# Patient Record
Sex: MALE | Race: WHITE | NOT HISPANIC OR LATINO | URBAN - METROPOLITAN AREA
[De-identification: names, ages, dates, MRNs, and addresses within clinical notes are randomized per-mention and may not be internally consistent; named-entity substitution may affect disease eponyms.]

---

## 2021-01-01 ENCOUNTER — EMERGENCY (EMERGENCY)
Facility: HOSPITAL | Age: 81
LOS: 1 days | End: 2021-01-01
Attending: EMERGENCY MEDICINE
Payer: MEDICARE

## 2021-01-01 VITALS
DIASTOLIC BLOOD PRESSURE: 73 MMHG | HEART RATE: 70 BPM | SYSTOLIC BLOOD PRESSURE: 178 MMHG | OXYGEN SATURATION: 95 % | TEMPERATURE: 98 F | HEIGHT: 68 IN | WEIGHT: 149.91 LBS

## 2021-01-01 VITALS
DIASTOLIC BLOOD PRESSURE: 86 MMHG | SYSTOLIC BLOOD PRESSURE: 169 MMHG | OXYGEN SATURATION: 95 % | HEART RATE: 78 BPM | RESPIRATION RATE: 18 BRPM

## 2021-01-01 LAB
ALBUMIN SERPL ELPH-MCNC: 4.1 G/DL — SIGNIFICANT CHANGE UP (ref 3.3–5)
ALP SERPL-CCNC: 68 U/L — SIGNIFICANT CHANGE UP (ref 40–120)
ALT FLD-CCNC: 33 U/L — SIGNIFICANT CHANGE UP (ref 10–45)
ANION GAP SERPL CALC-SCNC: 18 MMOL/L — HIGH (ref 5–17)
APTT BLD: 33.7 SEC — SIGNIFICANT CHANGE UP (ref 27.5–35.5)
AST SERPL-CCNC: 84 U/L — HIGH (ref 10–40)
BASE EXCESS BLDV CALC-SCNC: -3.7 MMOL/L — LOW (ref -2–2)
BASOPHILS # BLD AUTO: 0 K/UL — SIGNIFICANT CHANGE UP (ref 0–0.2)
BASOPHILS NFR BLD AUTO: 0 % — SIGNIFICANT CHANGE UP (ref 0–2)
BILIRUB SERPL-MCNC: 1 MG/DL — SIGNIFICANT CHANGE UP (ref 0.2–1.2)
BLD GP AB SCN SERPL QL: NEGATIVE — SIGNIFICANT CHANGE UP
BUN SERPL-MCNC: 32 MG/DL — HIGH (ref 7–23)
CA-I SERPL-SCNC: 1.17 MMOL/L — SIGNIFICANT CHANGE UP (ref 1.12–1.3)
CALCIUM SERPL-MCNC: 9.7 MG/DL — SIGNIFICANT CHANGE UP (ref 8.4–10.5)
CHLORIDE BLDV-SCNC: 108 MMOL/L — SIGNIFICANT CHANGE UP (ref 96–108)
CHLORIDE SERPL-SCNC: 102 MMOL/L — SIGNIFICANT CHANGE UP (ref 96–108)
CO2 BLDV-SCNC: 22 MMOL/L — SIGNIFICANT CHANGE UP (ref 22–30)
CO2 SERPL-SCNC: 18 MMOL/L — LOW (ref 22–31)
CREAT SERPL-MCNC: 2.29 MG/DL — HIGH (ref 0.5–1.3)
EOSINOPHIL # BLD AUTO: 0 K/UL — SIGNIFICANT CHANGE UP (ref 0–0.5)
EOSINOPHIL NFR BLD AUTO: 0 % — SIGNIFICANT CHANGE UP (ref 0–6)
GAS PNL BLDV: 135 MMOL/L — SIGNIFICANT CHANGE UP (ref 135–145)
GAS PNL BLDV: SIGNIFICANT CHANGE UP
GAS PNL BLDV: SIGNIFICANT CHANGE UP
GLUCOSE BLDV-MCNC: 206 MG/DL — HIGH (ref 70–99)
GLUCOSE SERPL-MCNC: 201 MG/DL — HIGH (ref 70–99)
HCO3 BLDV-SCNC: 21 MMOL/L — SIGNIFICANT CHANGE UP (ref 21–29)
HCT VFR BLD CALC: 45.7 % — SIGNIFICANT CHANGE UP (ref 39–50)
HCT VFR BLDA CALC: 50 % — SIGNIFICANT CHANGE UP (ref 39–50)
HGB BLD CALC-MCNC: 16.2 G/DL — SIGNIFICANT CHANGE UP (ref 13–17)
HGB BLD-MCNC: 15.9 G/DL — SIGNIFICANT CHANGE UP (ref 13–17)
INR BLD: 1.19 RATIO — HIGH (ref 0.88–1.16)
LACTATE BLDV-MCNC: 6 MMOL/L — CRITICAL HIGH (ref 0.7–2)
LYMPHOCYTES # BLD AUTO: 0.93 K/UL — LOW (ref 1–3.3)
LYMPHOCYTES # BLD AUTO: 3.5 % — LOW (ref 13–44)
MANUAL SMEAR VERIFICATION: SIGNIFICANT CHANGE UP
MCHC RBC-ENTMCNC: 32 PG — SIGNIFICANT CHANGE UP (ref 27–34)
MCHC RBC-ENTMCNC: 34.8 GM/DL — SIGNIFICANT CHANGE UP (ref 32–36)
MCV RBC AUTO: 92 FL — SIGNIFICANT CHANGE UP (ref 80–100)
MONOCYTES # BLD AUTO: 0.69 K/UL — SIGNIFICANT CHANGE UP (ref 0–0.9)
MONOCYTES NFR BLD AUTO: 2.6 % — SIGNIFICANT CHANGE UP (ref 2–14)
NEUTROPHILS # BLD AUTO: 24.9 K/UL — HIGH (ref 1.8–7.4)
NEUTROPHILS NFR BLD AUTO: 87.7 % — HIGH (ref 43–77)
NEUTS BAND # BLD: 6.2 % — SIGNIFICANT CHANGE UP (ref 0–8)
OB PNL STL: POSITIVE
PCO2 BLDV: 37 MMHG — SIGNIFICANT CHANGE UP (ref 35–50)
PH BLDV: 7.36 — SIGNIFICANT CHANGE UP (ref 7.35–7.45)
PLAT MORPH BLD: NORMAL — SIGNIFICANT CHANGE UP
PLATELET # BLD AUTO: 161 K/UL — SIGNIFICANT CHANGE UP (ref 150–400)
PLATELET COUNT - ESTIMATE: NORMAL — SIGNIFICANT CHANGE UP
PO2 BLDV: 39 MMHG — SIGNIFICANT CHANGE UP (ref 25–45)
POTASSIUM BLDV-SCNC: 4 MMOL/L — SIGNIFICANT CHANGE UP (ref 3.5–5.3)
POTASSIUM SERPL-MCNC: 4.2 MMOL/L — SIGNIFICANT CHANGE UP (ref 3.5–5.3)
POTASSIUM SERPL-SCNC: 4.2 MMOL/L — SIGNIFICANT CHANGE UP (ref 3.5–5.3)
PROT SERPL-MCNC: 6.7 G/DL — SIGNIFICANT CHANGE UP (ref 6–8.3)
PROTHROM AB SERPL-ACNC: 14.2 SEC — HIGH (ref 10.6–13.6)
RBC # BLD: 4.97 M/UL — SIGNIFICANT CHANGE UP (ref 4.2–5.8)
RBC # FLD: 13.2 % — SIGNIFICANT CHANGE UP (ref 10.3–14.5)
RBC BLD AUTO: SIGNIFICANT CHANGE UP
RH IG SCN BLD-IMP: POSITIVE — SIGNIFICANT CHANGE UP
SAO2 % BLDV: 68 % — SIGNIFICANT CHANGE UP (ref 67–88)
SARS-COV-2 RNA SPEC QL NAA+PROBE: SIGNIFICANT CHANGE UP
SODIUM SERPL-SCNC: 138 MMOL/L — SIGNIFICANT CHANGE UP (ref 135–145)
WBC # BLD: 26.52 K/UL — HIGH (ref 3.8–10.5)
WBC # FLD AUTO: 26.52 K/UL — HIGH (ref 3.8–10.5)

## 2021-01-01 PROCEDURE — 92950 HEART/LUNG RESUSCITATION CPR: CPT

## 2021-01-01 PROCEDURE — 85730 THROMBOPLASTIN TIME PARTIAL: CPT

## 2021-01-01 PROCEDURE — 83690 ASSAY OF LIPASE: CPT

## 2021-01-01 PROCEDURE — U0005: CPT

## 2021-01-01 PROCEDURE — 82435 ASSAY OF BLOOD CHLORIDE: CPT

## 2021-01-01 PROCEDURE — 85610 PROTHROMBIN TIME: CPT

## 2021-01-01 PROCEDURE — 85025 COMPLETE CBC W/AUTO DIFF WBC: CPT

## 2021-01-01 PROCEDURE — 99291 CRITICAL CARE FIRST HOUR: CPT | Mod: 25

## 2021-01-01 PROCEDURE — 83605 ASSAY OF LACTIC ACID: CPT

## 2021-01-01 PROCEDURE — 31500 INSERT EMERGENCY AIRWAY: CPT

## 2021-01-01 PROCEDURE — 82947 ASSAY GLUCOSE BLOOD QUANT: CPT

## 2021-01-01 PROCEDURE — 80053 COMPREHEN METABOLIC PANEL: CPT

## 2021-01-01 PROCEDURE — 82272 OCCULT BLD FECES 1-3 TESTS: CPT

## 2021-01-01 PROCEDURE — 82330 ASSAY OF CALCIUM: CPT

## 2021-01-01 PROCEDURE — 86850 RBC ANTIBODY SCREEN: CPT

## 2021-01-01 PROCEDURE — 93010 ELECTROCARDIOGRAM REPORT: CPT | Mod: 59

## 2021-01-01 PROCEDURE — 84295 ASSAY OF SERUM SODIUM: CPT

## 2021-01-01 PROCEDURE — 93005 ELECTROCARDIOGRAM TRACING: CPT | Mod: XU

## 2021-01-01 PROCEDURE — 86901 BLOOD TYPING SEROLOGIC RH(D): CPT

## 2021-01-01 PROCEDURE — 82803 BLOOD GASES ANY COMBINATION: CPT

## 2021-01-01 PROCEDURE — 99285 EMERGENCY DEPT VISIT HI MDM: CPT | Mod: 25

## 2021-01-01 PROCEDURE — 84132 ASSAY OF SERUM POTASSIUM: CPT

## 2021-01-01 PROCEDURE — 86900 BLOOD TYPING SEROLOGIC ABO: CPT

## 2021-01-01 PROCEDURE — U0003: CPT

## 2021-01-01 PROCEDURE — 85014 HEMATOCRIT: CPT

## 2021-01-01 PROCEDURE — 85018 HEMOGLOBIN: CPT

## 2021-01-01 RX ORDER — ACETAMINOPHEN 500 MG
650 TABLET ORAL ONCE
Refills: 0 | Status: COMPLETED | OUTPATIENT
Start: 2021-01-01 | End: 2021-01-01

## 2021-01-01 RX ORDER — SODIUM CHLORIDE 9 MG/ML
1000 INJECTION INTRAMUSCULAR; INTRAVENOUS; SUBCUTANEOUS ONCE
Refills: 0 | Status: COMPLETED | OUTPATIENT
Start: 2021-01-01 | End: 2021-01-01

## 2021-01-01 RX ORDER — ONDANSETRON 8 MG/1
4 TABLET, FILM COATED ORAL ONCE
Refills: 0 | Status: COMPLETED | OUTPATIENT
Start: 2021-01-01 | End: 2021-01-01

## 2021-03-02 NOTE — ED PROCEDURE NOTE - CPROC ED TRACHE INTUB DETAIL1
Patient was pre-oxygenated. An endotracheal tube (ETT) was placed through the vocal cords into the trachea.  ETT position was confirmed by auscultation of bilateral breath sounds to all lung fields. ETCO2 level was appropriate./Difficult/crash intubation (see additional details section).

## 2021-03-02 NOTE — ED ADULT NURSE REASSESSMENT NOTE - NS ED NURSE REASSESS COMMENT FT1
Pt noted to be unresponsive at 2230 witnessed while ED tech preforming EKG, RN/ MD Jain at bedside  CPR started at 2230  intubated @2241 7.5 ET tube, 25 @ lip line  Time of death called at 2254 by MD Shafer    See cardiac arrest flow sheet for medications and rhythms

## 2021-03-02 NOTE — ED PROVIDER NOTE - PHYSICAL EXAMINATION
Gen: WDWN, NAD  HEENT: EOMI, no nasal discharge, mucous membranes mildly dry  CV: RRR, +S1/S2, no M/R/G  Resp: CTAB, no W/R/R  GI: Abdomen soft non-distended, NTTP, no masses. + xiang blood on rectal exam, good tone, no external hemorrhoids.   MSK: No open wounds, no bruising, no LE edema  Neuro: A&Ox4, following commands, moving all four extremities spontaneously  Psych: appropriate mood Gen: WDWN, NAD  HEENT: EOMI, no nasal discharge, mucous membranes mildly dry, ncat  CV: RRR, +S1/S2, no M/R/G, non-tachycardic   Resp: CTAB, no W/R/R, non-tachypneic   GI: Abdomen soft non-distended, NTTP, no masses. + xiang blood on rectal exam, good tone, no external hemorrhoids.   MSK: No open wounds, no bruising, no LE edema  Neuro: A&Ox4, following commands, moving all four extremities spontaneously  Ext: no gross deformity of extremities, no asymmetry, a traumatic  Psych: appropriate mood, interactive, lack of insight into condition Gen: WDWN, mild distress  EYES: normal palpebral conjunctiva without evidence of pallor  HEENT: EOMI, no nasal discharge, mucous membranes mildly dry, ncat, moustache with brown stains  CV: RRR, +S1/S2, no M/R/G, non-tachycardic   Resp: CTAB, no W/R/R, non-tachypneic   GI: Abdomen soft non-distended, NTTP, no masses. + xiang blood on rectal exam, good tone, no external hemorrhoids.   MSK: No open wounds, no bruising, no LE edema  Neuro: A&Ox4, following commands, moving all four extremities spontaneously  Ext: no gross deformity of extremities, no asymmetry, a traumatic, normal perfusion  Psych: appropriate mood, interactive, lack of insight into condition

## 2021-03-02 NOTE — ED PROCEDURE NOTE - ATTENDING CONTRIBUTION TO CARE
***Amando Shafer MD FACEP*** Attending physician was available for the key components of the procedure, the patient tolerated well. There were no complications with the procedure.

## 2021-03-02 NOTE — ED PROVIDER NOTE - ATTENDING CONTRIBUTION TO CARE
Amando Shafer MD, FACEP: In this physician's medical judgement based on clinical history and physical exam, patient with gastrointestinal bleeding and confusion with lack of medical compliance. After coding the patient, friend of his states patient with history of back and shoulder pain today and confusion with anxiety over the past several months. Patient has not been acting normally.  plan was due to gib to get iv, cbc, cmp, cta a/p in bleeding scan, vbg, screening ekg, and reassess  team was called to bedside at 10:28 secondary to patient tilting his head back and becoming unresponsive, patient was not rousable, and pulses could not be detected  cpr was started at this time and pulses returned on the second pulse check but patient returned to PEA, 9 dosages of EPI were given and two units of PRBC were rapidly infused without a ROSC thereafter.  the team agreed on the plan, ultrasound with cardiac standstill, there were no other suggestions from the team to add to patient's care, there was a lack of pulses at 10:54 and TOD was called.  the ME accepts the case. Amando Shafer MD, FACEP: In this physician's medical judgement based on clinical history and physical exam, patient with gastrointestinal bleeding and confusion with lack of medical compliance. After coding the patient, friend of his states patient with history of back and shoulder pain today and confusion with anxiety over the past several months. Patient has not been acting normally.  plan due to gib was to get iv, cbc, cmp, cta a/p in bleeding scan, vbg, screening ekg, and reassess  team was called to bedside at 10:28 secondary to patient tilting his head back and becoming unresponsive, patient was not rousable, and pulses could not be detected  cpr was started at this time and pulses returned on the second pulse check but patient returned to PEA, 9 dosages of EPI were given and two units of PRBC were rapidly infused without a ROSC thereafter.  the team agreed on the plan, no other interventions or suggestions at this time for further care, the patient would not recover despite our maximal resuscitative efforts, ultrasound with cardiac standstill, there were no other suggestions from the team to add to patient's care, there was a lack of pulses at 10:54 and TOD was called.  the ME accepts the case.

## 2021-03-02 NOTE — ED PROVIDER NOTE - OBJECTIVE STATEMENT
as per friend, thrown up multiple times.  meds: ASA. 80y Male, no pmhx, complaining of rectal bleeding, abdominal pain, vomiting. VS stable. PE no abdominal tenderness, + gross blood on rectal exam. consider lower GI bleed, etiology possibly mesenteric ischemia, diverticulosis. plan for ct angio, basic labs, normal saline, zofran, admission. 80y Male, no pmhx, complaining of rectal bleeding, abdominal pain, vomiting. VS within normal limits. PE no abdominal tenderness, + gross blood on rectal exam. consider lower GI bleed, etiology possibly mesenteric ischemia, diverticulosis. plan for ct angio, basic labs, normal saline, zofran, admission.    On further history from Miguel Mann after patient , noted that patient had upper back pain and pain into the shoulders after lifting weights today. Of note he has had a history of paranoid schizophrenia per her, he said it was 'diagnosed as a youth', patient has had weight loss and increasing confusion and anxiety per Ms. Mann as well. Miguel tried to give him Tylenol today and cut it up for him into quarters because he could not swallow 80y Male, no pmhx, complaining of rectal bleeding (pt was unsure he was bleeding until shown), abdominal pain, vomiting. VS within normal limits. PE no abdominal tenderness, + gross blood on rectal exam. consider lower GI bleed, etiology possibly mesenteric ischemia, diverticulosis. plan for ct angio, basic labs, normal saline, zofran, admission.    On further history from Miguel Mann after patient , noted that patient had upper back pain and pain into the shoulders after lifting weights today. Of note he has had a history of paranoid schizophrenia per her, he said it was 'diagnosed as a youth', patient has had weight loss and increasing confusion and anxiety per Ms. Johnathan as well in the recent months to weeks. Miguel tried to give him Tylenol today and cut it up for him into quarters because he could not swallow. Patient has been confused but was more confused today than of late.

## 2021-03-02 NOTE — ED PROVIDER NOTE - CLINICAL SUMMARY MEDICAL DECISION MAKING FREE TEXT BOX
Radha Fletcher, PGY-1: 80y Male, no pmhx, complaining of rectal bleeding, abdominal pain, vomiting. Pt endorses abdominal pain, denies other complaints. Friend/healthcare proxy titus mentions the patient threw up multiple times, and had multiple episodes of blood bowel movements today. She also endorsed upper back and shoulder pain today. Takes ASA daily, no pmhx, doesn't see doctor regularly. denies infectious symptoms.

## 2021-03-02 NOTE — ED PROVIDER NOTE - CARE PLAN
Principal Discharge DX:	Cardiopulmonary arrest  Secondary Diagnosis:	Gastrointestinal bleeding, lower  Secondary Diagnosis:	Back pain

## 2021-03-02 NOTE — ED ADULT NURSE NOTE - OBJECTIVE STATEMENT
Pt is an 80 yr old male with no known pmh coming from home for nausea and vomiting and bright red stools. Pt hx given by wife over the phone. Pt has no complaints other than nausea. Pt denies headache, chest pain, sob, or dizziness while walking. Pt can ambulate to the toilet in the room without difficultly. Pt has been vomiting and having many stools a day with clots for the last few days. Pt is a/ox 2-3 -off situationally. Pt vitals within normal limits.

## 2021-03-02 NOTE — ED PROCEDURE NOTE - PROCEDURE ADDITIONAL DETAILS
initial attempt with DL unsuccessful, negative capnography, subsequent glidescope attempt successful.

## 2021-03-02 NOTE — ED PROVIDER NOTE - PROGRESS NOTE DETAILS
Radha Fletcher, PGY-1: spoke with friend Sheridan, healthcare proxy, on phone. Radha Fletcher, PGY-1: spoke with friend Miguel, she will come to the hospital for updates.

## 2021-03-02 NOTE — ED PROVIDER NOTE - NS ED ROS FT
Gen: Denies fevers  CV: Denies chest pain  Skin: denies color changes  Resp: Denies SOB, cough  Endo: Denies increased urination  GI: + vomiting, + GI bleed.   Msk: + shoulder pain.   : Denies dysuria  Neuro: Denies LOC  Psych: Denies mood changes Gen: Denies fevers  CV: Denies chest pain  Skin: denies color changes  Resp: Denies SOB, cough  Endo: Denies increased urination  GI: + vomiting, rectal bleed.   Msk: + shoulder pain, upper back pain.   : Denies dysuria  Neuro: Denies LOC

## 2021-03-02 NOTE — ED PROVIDER NOTE - OTHER
Gastrointestinal bleeding, possible aortic dissection with stroke Gastrointestinal bleeding, back pain with possible aortic dissection and stroke

## 2021-03-03 NOTE — ED ADULT NURSE REASSESSMENT NOTE - NS ED NURSE REASSESS COMMENT FT1
family given time at bedside to sit with patient. postmortem care preformed, toe tag and bag tag applied. chin trap placed on pt. saline soaked gauze laid over pts eyes.  family took pts belonging including clothing and dentures. family given business card to call regarding covid swab results.